# Patient Record
Sex: FEMALE | Race: WHITE | NOT HISPANIC OR LATINO
[De-identification: names, ages, dates, MRNs, and addresses within clinical notes are randomized per-mention and may not be internally consistent; named-entity substitution may affect disease eponyms.]

---

## 2020-07-06 ENCOUNTER — RESULT REVIEW (OUTPATIENT)
Age: 35
End: 2020-07-06

## 2020-10-08 ENCOUNTER — APPOINTMENT (OUTPATIENT)
Dept: ANTEPARTUM | Facility: CLINIC | Age: 35
End: 2020-10-08
Payer: COMMERCIAL

## 2020-10-08 PROCEDURE — 76830 TRANSVAGINAL US NON-OB: CPT

## 2020-10-15 ENCOUNTER — APPOINTMENT (OUTPATIENT)
Dept: ANTEPARTUM | Facility: CLINIC | Age: 35
End: 2020-10-15
Payer: COMMERCIAL

## 2020-10-15 PROCEDURE — 76801 OB US < 14 WKS SINGLE FETUS: CPT

## 2020-10-19 ENCOUNTER — TRANSCRIPTION ENCOUNTER (OUTPATIENT)
Age: 35
End: 2020-10-19

## 2020-10-22 ENCOUNTER — APPOINTMENT (OUTPATIENT)
Dept: ANTEPARTUM | Facility: CLINIC | Age: 35
End: 2020-10-22

## 2020-12-01 ENCOUNTER — APPOINTMENT (OUTPATIENT)
Dept: ANTEPARTUM | Facility: CLINIC | Age: 35
End: 2020-12-01
Payer: COMMERCIAL

## 2020-12-01 PROBLEM — Z00.00 ENCOUNTER FOR PREVENTIVE HEALTH EXAMINATION: Status: ACTIVE | Noted: 2020-12-01

## 2020-12-01 PROCEDURE — 76813 OB US NUCHAL MEAS 1 GEST: CPT

## 2020-12-01 PROCEDURE — 76801 OB US < 14 WKS SINGLE FETUS: CPT

## 2020-12-21 ENCOUNTER — APPOINTMENT (OUTPATIENT)
Dept: ANTEPARTUM | Facility: CLINIC | Age: 35
End: 2020-12-21
Payer: COMMERCIAL

## 2020-12-21 PROCEDURE — 76805 OB US >/= 14 WKS SNGL FETUS: CPT

## 2020-12-21 PROCEDURE — 76817 TRANSVAGINAL US OBSTETRIC: CPT

## 2020-12-21 PROCEDURE — 99072 ADDL SUPL MATRL&STAF TM PHE: CPT

## 2021-02-03 ENCOUNTER — APPOINTMENT (OUTPATIENT)
Dept: ANTEPARTUM | Facility: CLINIC | Age: 36
End: 2021-02-03
Payer: COMMERCIAL

## 2021-02-03 PROCEDURE — 99072 ADDL SUPL MATRL&STAF TM PHE: CPT

## 2021-02-03 PROCEDURE — 76816 OB US FOLLOW-UP PER FETUS: CPT

## 2021-02-03 PROCEDURE — 76817 TRANSVAGINAL US OBSTETRIC: CPT

## 2021-02-16 ENCOUNTER — ASOB RESULT (OUTPATIENT)
Age: 36
End: 2021-02-16

## 2021-02-16 ENCOUNTER — APPOINTMENT (OUTPATIENT)
Dept: ANTEPARTUM | Facility: CLINIC | Age: 36
End: 2021-02-16
Payer: COMMERCIAL

## 2021-02-16 PROCEDURE — 76816 OB US FOLLOW-UP PER FETUS: CPT

## 2021-02-16 PROCEDURE — 99072 ADDL SUPL MATRL&STAF TM PHE: CPT

## 2021-03-23 ENCOUNTER — ASOB RESULT (OUTPATIENT)
Age: 36
End: 2021-03-23

## 2021-03-23 ENCOUNTER — APPOINTMENT (OUTPATIENT)
Dept: ANTEPARTUM | Facility: CLINIC | Age: 36
End: 2021-03-23
Payer: COMMERCIAL

## 2021-03-23 PROCEDURE — 76816 OB US FOLLOW-UP PER FETUS: CPT

## 2021-03-23 PROCEDURE — 99072 ADDL SUPL MATRL&STAF TM PHE: CPT

## 2021-03-23 PROCEDURE — 76819 FETAL BIOPHYS PROFIL W/O NST: CPT

## 2021-04-20 ENCOUNTER — APPOINTMENT (OUTPATIENT)
Dept: ANTEPARTUM | Facility: CLINIC | Age: 36
End: 2021-04-20

## 2021-06-10 ENCOUNTER — INPATIENT (INPATIENT)
Facility: HOSPITAL | Age: 36
LOS: 1 days | Discharge: HOME | End: 2021-06-12
Attending: OBSTETRICS & GYNECOLOGY | Admitting: OBSTETRICS & GYNECOLOGY
Payer: COMMERCIAL

## 2021-06-10 VITALS — HEIGHT: 65 IN | WEIGHT: 145.06 LBS

## 2021-06-10 LAB
ALBUMIN SERPL ELPH-MCNC: 3.7 G/DL — SIGNIFICANT CHANGE UP (ref 3.3–5)
ALP SERPL-CCNC: 169 U/L — HIGH (ref 40–120)
ALT FLD-CCNC: 16 U/L — SIGNIFICANT CHANGE UP (ref 10–45)
ANION GAP SERPL CALC-SCNC: 12 MMOL/L — SIGNIFICANT CHANGE UP (ref 5–17)
APTT BLD: 24.7 SEC — LOW (ref 27.5–35.5)
AST SERPL-CCNC: 23 U/L — SIGNIFICANT CHANGE UP (ref 10–40)
BASOPHILS # BLD AUTO: 0.02 K/UL — SIGNIFICANT CHANGE UP (ref 0–0.2)
BASOPHILS NFR BLD AUTO: 0.3 % — SIGNIFICANT CHANGE UP (ref 0–2)
BILIRUB SERPL-MCNC: 0.2 MG/DL — SIGNIFICANT CHANGE UP (ref 0.2–1.2)
BLD GP AB SCN SERPL QL: NEGATIVE — SIGNIFICANT CHANGE UP
BUN SERPL-MCNC: 9 MG/DL — SIGNIFICANT CHANGE UP (ref 7–23)
CALCIUM SERPL-MCNC: 9 MG/DL — SIGNIFICANT CHANGE UP (ref 8.4–10.5)
CHLORIDE SERPL-SCNC: 100 MMOL/L — SIGNIFICANT CHANGE UP (ref 96–108)
CO2 SERPL-SCNC: 21 MMOL/L — LOW (ref 22–31)
COVID-19 SPIKE DOMAIN AB INTERP: POSITIVE
COVID-19 SPIKE DOMAIN ANTIBODY RESULT: >250 U/ML — HIGH
CREAT ?TM UR-MCNC: 94 MG/DL — SIGNIFICANT CHANGE UP
CREAT SERPL-MCNC: 0.6 MG/DL — SIGNIFICANT CHANGE UP (ref 0.5–1.3)
EOSINOPHIL # BLD AUTO: 0.02 K/UL — SIGNIFICANT CHANGE UP (ref 0–0.5)
EOSINOPHIL NFR BLD AUTO: 0.3 % — SIGNIFICANT CHANGE UP (ref 0–6)
FIBRINOGEN PPP-MCNC: 436 MG/DL — SIGNIFICANT CHANGE UP (ref 258–438)
GLUCOSE SERPL-MCNC: 96 MG/DL — SIGNIFICANT CHANGE UP (ref 70–99)
HCT VFR BLD CALC: 33.1 % — LOW (ref 34.5–45)
HGB BLD-MCNC: 11.2 G/DL — LOW (ref 11.5–15.5)
IMM GRANULOCYTES NFR BLD AUTO: 0.6 % — SIGNIFICANT CHANGE UP (ref 0–1.5)
INR BLD: 0.9 — SIGNIFICANT CHANGE UP (ref 0.88–1.16)
LDH SERPL L TO P-CCNC: 213 U/L — SIGNIFICANT CHANGE UP (ref 50–242)
LYMPHOCYTES # BLD AUTO: 1.33 K/UL — SIGNIFICANT CHANGE UP (ref 1–3.3)
LYMPHOCYTES # BLD AUTO: 17 % — SIGNIFICANT CHANGE UP (ref 13–44)
MCHC RBC-ENTMCNC: 31.3 PG — SIGNIFICANT CHANGE UP (ref 27–34)
MCHC RBC-ENTMCNC: 33.8 GM/DL — SIGNIFICANT CHANGE UP (ref 32–36)
MCV RBC AUTO: 92.5 FL — SIGNIFICANT CHANGE UP (ref 80–100)
MONOCYTES # BLD AUTO: 0.64 K/UL — SIGNIFICANT CHANGE UP (ref 0–0.9)
MONOCYTES NFR BLD AUTO: 8.2 % — SIGNIFICANT CHANGE UP (ref 2–14)
NEUTROPHILS # BLD AUTO: 5.76 K/UL — SIGNIFICANT CHANGE UP (ref 1.8–7.4)
NEUTROPHILS NFR BLD AUTO: 73.6 % — SIGNIFICANT CHANGE UP (ref 43–77)
NRBC # BLD: 0 /100 WBCS — SIGNIFICANT CHANGE UP (ref 0–0)
PLATELET # BLD AUTO: 289 K/UL — SIGNIFICANT CHANGE UP (ref 150–400)
POTASSIUM SERPL-MCNC: 3.8 MMOL/L — SIGNIFICANT CHANGE UP (ref 3.5–5.3)
POTASSIUM SERPL-SCNC: 3.8 MMOL/L — SIGNIFICANT CHANGE UP (ref 3.5–5.3)
PROT ?TM UR-MCNC: 14 MG/DL — HIGH (ref 0–12)
PROT SERPL-MCNC: 7.2 G/DL — SIGNIFICANT CHANGE UP (ref 6–8.3)
PROT/CREAT UR-RTO: 0.1 RATIO — SIGNIFICANT CHANGE UP (ref 0–0.2)
PROTHROM AB SERPL-ACNC: 10.9 SEC — SIGNIFICANT CHANGE UP (ref 10.6–13.6)
RBC # BLD: 3.58 M/UL — LOW (ref 3.8–5.2)
RBC # FLD: 13.2 % — SIGNIFICANT CHANGE UP (ref 10.3–14.5)
RH IG SCN BLD-IMP: POSITIVE — SIGNIFICANT CHANGE UP
RH IG SCN BLD-IMP: POSITIVE — SIGNIFICANT CHANGE UP
SARS-COV-2 IGG+IGM SERPL QL IA: >250 U/ML — HIGH
SARS-COV-2 IGG+IGM SERPL QL IA: POSITIVE
SARS-COV-2 RNA SPEC QL NAA+PROBE: SIGNIFICANT CHANGE UP
SODIUM SERPL-SCNC: 133 MMOL/L — LOW (ref 135–145)
T PALLIDUM AB TITR SER: NEGATIVE — SIGNIFICANT CHANGE UP
URATE SERPL-MCNC: 5.3 MG/DL — SIGNIFICANT CHANGE UP (ref 2.5–7)
WBC # BLD: 7.82 K/UL — SIGNIFICANT CHANGE UP (ref 3.8–10.5)
WBC # FLD AUTO: 7.82 K/UL — SIGNIFICANT CHANGE UP (ref 3.8–10.5)

## 2021-06-10 RX ORDER — IBUPROFEN 200 MG
600 TABLET ORAL EVERY 6 HOURS
Refills: 0 | Status: DISCONTINUED | OUTPATIENT
Start: 2021-06-10 | End: 2021-06-12

## 2021-06-10 RX ORDER — FENTANYL/BUPIVACAINE/NS/PF 2MCG/ML-.1
250 PLASTIC BAG, INJECTION (ML) INJECTION
Refills: 0 | Status: DISCONTINUED | OUTPATIENT
Start: 2021-06-10 | End: 2021-06-10

## 2021-06-10 RX ORDER — DIPHENHYDRAMINE HCL 50 MG
25 CAPSULE ORAL EVERY 6 HOURS
Refills: 0 | Status: DISCONTINUED | OUTPATIENT
Start: 2021-06-10 | End: 2021-06-12

## 2021-06-10 RX ORDER — TETANUS TOXOID, REDUCED DIPHTHERIA TOXOID AND ACELLULAR PERTUSSIS VACCINE, ADSORBED 5; 2.5; 8; 8; 2.5 [IU]/.5ML; [IU]/.5ML; UG/.5ML; UG/.5ML; UG/.5ML
0.5 SUSPENSION INTRAMUSCULAR ONCE
Refills: 0 | Status: DISCONTINUED | OUTPATIENT
Start: 2021-06-10 | End: 2021-06-12

## 2021-06-10 RX ORDER — AER TRAVELER 0.5 G/1
1 SOLUTION RECTAL; TOPICAL EVERY 4 HOURS
Refills: 0 | Status: DISCONTINUED | OUTPATIENT
Start: 2021-06-10 | End: 2021-06-12

## 2021-06-10 RX ORDER — SODIUM CHLORIDE 9 MG/ML
1000 INJECTION, SOLUTION INTRAVENOUS
Refills: 0 | Status: DISCONTINUED | OUTPATIENT
Start: 2021-06-10 | End: 2021-06-10

## 2021-06-10 RX ORDER — BENZOCAINE 10 %
1 GEL (GRAM) MUCOUS MEMBRANE EVERY 6 HOURS
Refills: 0 | Status: DISCONTINUED | OUTPATIENT
Start: 2021-06-10 | End: 2021-06-12

## 2021-06-10 RX ORDER — DIBUCAINE 1 %
1 OINTMENT (GRAM) RECTAL EVERY 6 HOURS
Refills: 0 | Status: DISCONTINUED | OUTPATIENT
Start: 2021-06-10 | End: 2021-06-12

## 2021-06-10 RX ORDER — SIMETHICONE 80 MG/1
80 TABLET, CHEWABLE ORAL EVERY 4 HOURS
Refills: 0 | Status: DISCONTINUED | OUTPATIENT
Start: 2021-06-10 | End: 2021-06-12

## 2021-06-10 RX ORDER — SODIUM CHLORIDE 9 MG/ML
3 INJECTION INTRAMUSCULAR; INTRAVENOUS; SUBCUTANEOUS EVERY 8 HOURS
Refills: 0 | Status: DISCONTINUED | OUTPATIENT
Start: 2021-06-10 | End: 2021-06-12

## 2021-06-10 RX ORDER — IBUPROFEN 200 MG
600 TABLET ORAL EVERY 6 HOURS
Refills: 0 | Status: COMPLETED | OUTPATIENT
Start: 2021-06-10 | End: 2022-05-09

## 2021-06-10 RX ORDER — MAGNESIUM HYDROXIDE 400 MG/1
30 TABLET, CHEWABLE ORAL
Refills: 0 | Status: DISCONTINUED | OUTPATIENT
Start: 2021-06-10 | End: 2021-06-12

## 2021-06-10 RX ORDER — KETOROLAC TROMETHAMINE 30 MG/ML
30 SYRINGE (ML) INJECTION ONCE
Refills: 0 | Status: DISCONTINUED | OUTPATIENT
Start: 2021-06-10 | End: 2021-06-10

## 2021-06-10 RX ORDER — OXYTOCIN 10 UNIT/ML
333.33 VIAL (ML) INJECTION
Qty: 20 | Refills: 0 | Status: DISCONTINUED | OUTPATIENT
Start: 2021-06-10 | End: 2021-06-10

## 2021-06-10 RX ORDER — LANOLIN
1 OINTMENT (GRAM) TOPICAL EVERY 6 HOURS
Refills: 0 | Status: DISCONTINUED | OUTPATIENT
Start: 2021-06-10 | End: 2021-06-12

## 2021-06-10 RX ORDER — HYDROCORTISONE 1 %
1 OINTMENT (GRAM) TOPICAL EVERY 6 HOURS
Refills: 0 | Status: DISCONTINUED | OUTPATIENT
Start: 2021-06-10 | End: 2021-06-12

## 2021-06-10 RX ORDER — PRAMOXINE HYDROCHLORIDE 150 MG/15G
1 AEROSOL, FOAM RECTAL EVERY 4 HOURS
Refills: 0 | Status: DISCONTINUED | OUTPATIENT
Start: 2021-06-10 | End: 2021-06-12

## 2021-06-10 RX ORDER — OXYCODONE HYDROCHLORIDE 5 MG/1
5 TABLET ORAL
Refills: 0 | Status: DISCONTINUED | OUTPATIENT
Start: 2021-06-10 | End: 2021-06-12

## 2021-06-10 RX ORDER — OXYTOCIN 10 UNIT/ML
333.33 VIAL (ML) INJECTION
Qty: 20 | Refills: 0 | Status: DISCONTINUED | OUTPATIENT
Start: 2021-06-10 | End: 2021-06-12

## 2021-06-10 RX ORDER — ACETAMINOPHEN 500 MG
975 TABLET ORAL
Refills: 0 | Status: DISCONTINUED | OUTPATIENT
Start: 2021-06-10 | End: 2021-06-12

## 2021-06-10 RX ORDER — OXYCODONE HYDROCHLORIDE 5 MG/1
5 TABLET ORAL ONCE
Refills: 0 | Status: DISCONTINUED | OUTPATIENT
Start: 2021-06-10 | End: 2021-06-12

## 2021-06-10 RX ORDER — CITRIC ACID/SODIUM CITRATE 300-500 MG
15 SOLUTION, ORAL ORAL EVERY 6 HOURS
Refills: 0 | Status: DISCONTINUED | OUTPATIENT
Start: 2021-06-10 | End: 2021-06-10

## 2021-06-10 RX ADMIN — SODIUM CHLORIDE 125 MILLILITER(S): 9 INJECTION, SOLUTION INTRAVENOUS at 06:39

## 2021-06-10 RX ADMIN — Medication 30 MILLIGRAM(S): at 11:30

## 2021-06-10 RX ADMIN — Medication 975 MILLIGRAM(S): at 15:04

## 2021-06-10 RX ADMIN — Medication 975 MILLIGRAM(S): at 21:12

## 2021-06-10 RX ADMIN — Medication 250 MILLILITER(S): at 07:26

## 2021-06-10 RX ADMIN — Medication 600 MILLIGRAM(S): at 18:30

## 2021-06-10 RX ADMIN — Medication 975 MILLIGRAM(S): at 21:48

## 2021-06-10 RX ADMIN — Medication 1000 MILLIUNIT(S)/MIN: at 11:45

## 2021-06-10 RX ADMIN — Medication 600 MILLIGRAM(S): at 17:40

## 2021-06-10 RX ADMIN — MAGNESIUM HYDROXIDE 30 MILLILITER(S): 400 TABLET, CHEWABLE ORAL at 21:12

## 2021-06-11 ENCOUNTER — TRANSCRIPTION ENCOUNTER (OUTPATIENT)
Age: 36
End: 2021-06-11

## 2021-06-11 RX ORDER — IBUPROFEN 200 MG
1 TABLET ORAL
Qty: 0 | Refills: 0 | DISCHARGE
Start: 2021-06-11

## 2021-06-11 RX ORDER — ACETAMINOPHEN 500 MG
3 TABLET ORAL
Qty: 0 | Refills: 0 | DISCHARGE
Start: 2021-06-11

## 2021-06-11 RX ORDER — VALACYCLOVIR 500 MG/1
1 TABLET, FILM COATED ORAL
Qty: 0 | Refills: 0 | DISCHARGE

## 2021-06-11 RX ADMIN — Medication 975 MILLIGRAM(S): at 14:51

## 2021-06-11 RX ADMIN — Medication 600 MILLIGRAM(S): at 07:15

## 2021-06-11 RX ADMIN — Medication 600 MILLIGRAM(S): at 06:43

## 2021-06-11 RX ADMIN — Medication 975 MILLIGRAM(S): at 21:09

## 2021-06-11 RX ADMIN — Medication 975 MILLIGRAM(S): at 10:00

## 2021-06-11 RX ADMIN — Medication 600 MILLIGRAM(S): at 23:56

## 2021-06-11 RX ADMIN — Medication 600 MILLIGRAM(S): at 01:22

## 2021-06-11 RX ADMIN — Medication 600 MILLIGRAM(S): at 17:40

## 2021-06-11 RX ADMIN — OXYCODONE HYDROCHLORIDE 5 MILLIGRAM(S): 5 TABLET ORAL at 10:03

## 2021-06-11 RX ADMIN — Medication 600 MILLIGRAM(S): at 13:00

## 2021-06-11 RX ADMIN — Medication 600 MILLIGRAM(S): at 01:50

## 2021-06-11 RX ADMIN — Medication 975 MILLIGRAM(S): at 15:45

## 2021-06-11 RX ADMIN — Medication 600 MILLIGRAM(S): at 18:30

## 2021-06-11 RX ADMIN — Medication 975 MILLIGRAM(S): at 09:23

## 2021-06-11 RX ADMIN — SODIUM CHLORIDE 3 MILLILITER(S): 9 INJECTION INTRAMUSCULAR; INTRAVENOUS; SUBCUTANEOUS at 14:56

## 2021-06-11 RX ADMIN — Medication 1 TABLET(S): at 12:07

## 2021-06-11 RX ADMIN — Medication 975 MILLIGRAM(S): at 22:00

## 2021-06-11 RX ADMIN — SODIUM CHLORIDE 3 MILLILITER(S): 9 INJECTION INTRAMUSCULAR; INTRAVENOUS; SUBCUTANEOUS at 22:00

## 2021-06-11 RX ADMIN — Medication 600 MILLIGRAM(S): at 12:07

## 2021-06-11 NOTE — DISCHARGE NOTE OB - CARE PROVIDER_API CALL
Alejandra Herrera)  Obstetrics and Gynecology  [address] [place] [address]  Phone: [phone]  Fax: [phone]  Follow Up Time:

## 2021-06-11 NOTE — DISCHARGE NOTE OB - PATIENT PORTAL LINK FT
You can access the FollowMyHealth Patient Portal offered by [facility] by registering at the following website: [email or web address]. By joining [facility] [facility]’s FollowMyHealth portal, you will also be able to view your health information using other applications (apps) compatible with our system.

## 2021-06-11 NOTE — DISCHARGE NOTE OB - VISION (WITH CORRECTIVE LENSES IF THE PATIENT USUALLY WEARS THEM):
"States she gets \"ear infections a lot\" . In the past year she states she has had 5 ear infections. Did use some ear drops and a nasal spray that she has, but \"it didn't help\". States \"when I yawn, it hurts..the  pressure is greater\" States it is \"kind of making me dizzy\" Left ear canal is red.  "
Reviewed discharge instructions with pt, verbalized understanding, copy of discharge instructions provided, along with work excuse, per pt request.   
Normal vision: sees adequately in most situations; can see medication labels, newsprint

## 2021-06-11 NOTE — LACTATION INITIAL EVALUATION - NS LACT CON REASON FOR REQ
Met with dyad at about 20 hours of life s/p  @ 40 wks gestation. Pt is now P1 and denied having any significant medical history. Per pt infant has latched and fed but has not been doing so consistently. She has inverted nipples that sara slightly with stimulation and easily expressible colostrum bilaterally. Pt was not feeling well at the time of this visit and did not want to latch infant, RN was demonstrating syringe feeding formula at the pt and FOB's request. Pt stated she was able to listen to breastfeeding education when given the option to have an LC return later. Reviewed breastfeeding education including the milk production feedback system, responsive/cue based feedings, infant feeding cues, feeding frequency, benefits of skin to skin, how to determine the infant is transferring enough at the breast and cluster feeding. Infant is stooling and voiding per parents. Answered all questions. Informed about LC availability. Offered telehealth lactation follow up after discharge and pt agreed. Pt and FOB verbalized understanding of all information./primaparous mom

## 2021-06-11 NOTE — LACTATION INITIAL EVALUATION - INTERVENTION OUTCOME
Pt will call when she is ready for LC to follow up./verbalizes understanding/demonstrates understanding of teaching/good return demonstration/Lactation team to follow up

## 2021-06-11 NOTE — LACTATION INITIAL EVALUATION - LACTATION INTERVENTIONS
Reviewed breastfeeding education and information in the [facility]. Answered all questions. Informed about LC availability./initiate skin to skin/initiate hand expression routine/initiate/review early breastfeeding management guidelines/initiate/review techniques for position and latch/post discharge community resources provided

## 2021-06-12 VITALS
TEMPERATURE: 98 F | DIASTOLIC BLOOD PRESSURE: 70 MMHG | OXYGEN SATURATION: 97 % | HEART RATE: 80 BPM | RESPIRATION RATE: 17 BRPM | SYSTOLIC BLOOD PRESSURE: 108 MMHG

## 2021-06-12 RX ADMIN — Medication 975 MILLIGRAM(S): at 09:10

## 2021-06-12 RX ADMIN — Medication 600 MILLIGRAM(S): at 12:37

## 2021-06-12 RX ADMIN — Medication 975 MILLIGRAM(S): at 03:22

## 2021-06-12 RX ADMIN — Medication 600 MILLIGRAM(S): at 06:18

## 2021-06-12 RX ADMIN — Medication 975 MILLIGRAM(S): at 10:00

## 2021-06-12 RX ADMIN — Medication 0.5 MILLILITER(S): at 12:38

## 2021-06-12 RX ADMIN — Medication 600 MILLIGRAM(S): at 13:15

## 2021-06-12 RX ADMIN — Medication 1 APPLICATION(S): at 09:10

## 2021-06-12 RX ADMIN — Medication 600 MILLIGRAM(S): at 07:15

## 2021-06-12 RX ADMIN — SODIUM CHLORIDE 3 MILLILITER(S): 9 INJECTION INTRAMUSCULAR; INTRAVENOUS; SUBCUTANEOUS at 06:16

## 2021-06-12 RX ADMIN — Medication 1 TABLET(S): at 09:10

## 2021-06-12 RX ADMIN — Medication 975 MILLIGRAM(S): at 04:20

## 2021-06-12 RX ADMIN — Medication 600 MILLIGRAM(S): at 00:50

## 2021-06-12 NOTE — PROGRESS NOTE ADULT - SUBJECTIVE AND OBJECTIVE BOX
Patient evaluated at bedside.  No acute events overnight.    She reports pain is well controlled with medications.     She has been ambulating without assistance and is voiding spontaneously. Reports decrease in vaginal bleeding and denies clots.     She denies HA, dizziness, chest pain, palpitations, shortness of breath, n/v, heavy vaginal bleeding or perineal discomfort.    Physical Exam:  Vital Signs Last 24 Hrs  T(C): 36.7 (11 Jun 2021 22:00), Max: 36.7 (11 Jun 2021 17:38)  T(F): 98 (11 Jun 2021 22:00), Max: 98.1 (11 Jun 2021 17:38)  HR: 82 (11 Jun 2021 22:00) (78 - 82)  BP: 118/74 (11 Jun 2021 22:00) (116/77 - 125/70)  BP(mean): --  RR: 18 (11 Jun 2021 22:00) (17 - 18)  SpO2: 99% (11 Jun 2021 22:00) (98% - 99%)    GA: NAD, A+0 x 3  Abd: + BS, soft, appropriately tender, nondistended, no rebound or guarding, uterus firm at midline  : lochia WNL  Extremities: no edema or calf tenderness                          11.2   7.82  )-----------( 289      ( 10 Evens 2021 06:33 )             33.1     06-10    133<L>  |  100  |  9   ----------------------------<  96  3.8   |  21<L>  |  0.60    Ca    9.0      10 Evens 2021 06:39    TPro  7.2  /  Alb  3.7  /  TBili  0.2  /  DBili  x   /  AST  23  /  ALT  16  /  AlkPhos  169<H>  06-10    PT/INR - ( 10 Evens 2021 06:39 )   PT: 10.9 sec;   INR: 0.90          PTT - ( 10 Evens 2021 06:39 )  PTT:24.7 sec    
Pt seen and evaluated at bedside this morning. No overnight events. Pt is resting comfortably in bed and reports her pain is well controlled. Pt reports some perineal discomfort and cramping but denies heavy vaginal bleeding/clots. Pt is ambulating without assistance. Pt is eating/drinking w/o N/V. +voiding +flatus -bm. Pt denies HA, dizziness/lightheadedness, weakness, changes in vision, leg swelling, chest pain, shortness of breath, or fevers/chills.    Physical Exam:  Vital Signs Last 24 Hrs  T(C): 36.6 (10 Evens 2021 21:36), Max: 36.6 (10 Evens 2021 11:00)  T(F): 97.9 (10 Evens 2021 21:36), Max: 97.9 (10 Evens 2021 11:00)  HR: 71 (10 Evens 2021 21:36) (68 - 85)  BP: 128/83 (10 Evens 2021 21:36) (109/62 - 128/83)  RR: 17 (10 Evens 2021 21:36) (16 - 18)  SpO2: 98% (10 Evens 2021 21:36) (98% - 99%)    Gen: NAD, A&0x3  Pulm: no increased WOB  Abd: soft, appropriately tended to palpation, nondistended, no rebound or guarding, uterus firm at midline under the umbilicus  : minimal lochia noted on pad  Extremities: no edema or calf tenderness to palpation                          11.2   7.82  )-----------( 289      ( 10 Evens 2021 06:33 )             33.1     06-10    133<L>  |  100  |  9   ----------------------------<  96  3.8   |  21<L>  |  0.60    Ca    9.0      10 Evens 2021 06:39    TPro  7.2  /  Alb  3.7  /  TBili  0.2  /  DBili  x   /  AST  23  /  ALT  16  /  AlkPhos  169<H>  06-10    PT/INR - ( 10 Evens 2021 06:39 )   PT: 10.9 sec;   INR: 0.90          PTT - ( 10 Evens 2021 06:39 )  PTT:24.7 sec

## 2021-06-12 NOTE — PROGRESS NOTE ADULT - ASSESSMENT
A/P yo 36y  s/p , PP#2 , stable, meeting postpartum milestones  - Pain: controlled on tylenol and motrin  - GI: Tolerating regular diet  - :  Voiding spontaneously without pain or difficulty  - DVT prophylaxis: ambulating well, no SCDs needed at this time   - Dispo: PP#2 unless otherwise specified 
A/P 36yoF s/p , now PP1 with normal postpartum course. Vital signs stable.  - Continue routine postpartum care  - Pain: well controlled on oral pain medication  - GI: tolerating regular diet  - : voiding without difficulty  - DVT ppx: ambulating without assistance, no SCDs required at this time  - Dispo: PP2 or when pt feels ready

## 2021-06-14 ENCOUNTER — RESULT REVIEW (OUTPATIENT)
Age: 36
End: 2021-06-14

## 2021-06-14 PROCEDURE — 86769 SARS-COV-2 COVID-19 ANTIBODY: CPT

## 2021-06-14 PROCEDURE — 90707 MMR VACCINE SC: CPT

## 2021-06-14 PROCEDURE — U0005: CPT

## 2021-06-14 PROCEDURE — 88307 TISSUE EXAM BY PATHOLOGIST: CPT

## 2021-06-14 PROCEDURE — 86900 BLOOD TYPING SEROLOGIC ABO: CPT

## 2021-06-14 PROCEDURE — 86850 RBC ANTIBODY SCREEN: CPT

## 2021-06-14 PROCEDURE — U0003: CPT

## 2021-06-14 PROCEDURE — 82570 ASSAY OF URINE CREATININE: CPT

## 2021-06-14 PROCEDURE — 84550 ASSAY OF BLOOD/URIC ACID: CPT

## 2021-06-14 PROCEDURE — 85730 THROMBOPLASTIN TIME PARTIAL: CPT

## 2021-06-14 PROCEDURE — 84156 ASSAY OF PROTEIN URINE: CPT

## 2021-06-14 PROCEDURE — 86901 BLOOD TYPING SEROLOGIC RH(D): CPT

## 2021-06-14 PROCEDURE — 85025 COMPLETE CBC W/AUTO DIFF WBC: CPT

## 2021-06-14 PROCEDURE — 85610 PROTHROMBIN TIME: CPT

## 2021-06-14 PROCEDURE — 83615 LACTATE (LD) (LDH) ENZYME: CPT

## 2021-06-14 PROCEDURE — 86780 TREPONEMA PALLIDUM: CPT

## 2021-06-14 PROCEDURE — 59025 FETAL NON-STRESS TEST: CPT

## 2021-06-14 PROCEDURE — 88307 TISSUE EXAM BY PATHOLOGIST: CPT | Mod: 26

## 2021-06-14 PROCEDURE — 36415 COLL VENOUS BLD VENIPUNCTURE: CPT

## 2021-06-14 PROCEDURE — 80053 COMPREHEN METABOLIC PANEL: CPT

## 2021-06-14 PROCEDURE — 59050 FETAL MONITOR W/REPORT: CPT

## 2021-06-14 PROCEDURE — 85384 FIBRINOGEN ACTIVITY: CPT

## 2021-06-16 LAB — SURGICAL PATHOLOGY STUDY: SIGNIFICANT CHANGE UP

## 2021-06-18 DIAGNOSIS — A60.00 HERPESVIRAL INFECTION OF UROGENITAL SYSTEM, UNSPECIFIED: ICD-10-CM

## 2021-06-18 DIAGNOSIS — Z3A.40 40 WEEKS GESTATION OF PREGNANCY: ICD-10-CM

## 2024-04-16 NOTE — PATIENT PROFILE OB - BREAST MILK PROVIDES PROTECTION AGAINST INFECTION
Addended by: YELENA TOLLIVER on: 4/16/2024 08:58 AM     Modules accepted: Orders     Statement Selected